# Patient Record
Sex: MALE | Race: ASIAN | NOT HISPANIC OR LATINO | Employment: FULL TIME | URBAN - METROPOLITAN AREA
[De-identification: names, ages, dates, MRNs, and addresses within clinical notes are randomized per-mention and may not be internally consistent; named-entity substitution may affect disease eponyms.]

---

## 2023-06-05 ENCOUNTER — HOSPITAL ENCOUNTER (EMERGENCY)
Facility: HOSPITAL | Age: 38
Discharge: HOME | End: 2023-06-05
Attending: EMERGENCY MEDICINE | Admitting: EMERGENCY MEDICINE
Payer: COMMERCIAL

## 2023-06-05 ENCOUNTER — APPOINTMENT (OUTPATIENT)
Dept: RADIOLOGY | Facility: HOSPITAL | Age: 38
End: 2023-06-05
Attending: EMERGENCY MEDICINE
Payer: COMMERCIAL

## 2023-06-05 VITALS
RESPIRATION RATE: 18 BRPM | DIASTOLIC BLOOD PRESSURE: 102 MMHG | WEIGHT: 231.48 LBS | HEART RATE: 83 BPM | TEMPERATURE: 98 F | OXYGEN SATURATION: 96 % | SYSTOLIC BLOOD PRESSURE: 150 MMHG

## 2023-06-05 DIAGNOSIS — R05.1 ACUTE COUGH: ICD-10-CM

## 2023-06-05 DIAGNOSIS — R06.02 SHORTNESS OF BREATH: ICD-10-CM

## 2023-06-05 LAB
ANION GAP SERPL CALCULATED.3IONS-SCNC: 10 MMOL/L (ref 7–15)
BASOPHILS # BLD AUTO: 0.1 10E3/UL (ref 0–0.2)
BASOPHILS NFR BLD AUTO: 1 %
BUN SERPL-MCNC: 14.2 MG/DL (ref 6–20)
CALCIUM SERPL-MCNC: 9.2 MG/DL (ref 8.6–10)
CHLORIDE SERPL-SCNC: 105 MMOL/L (ref 98–107)
CREAT SERPL-MCNC: 0.89 MG/DL (ref 0.67–1.17)
D DIMER PPP FEU-MCNC: <0.27 UG/ML FEU (ref 0–0.5)
DEPRECATED HCO3 PLAS-SCNC: 24 MMOL/L (ref 22–29)
EOSINOPHIL # BLD AUTO: 0.3 10E3/UL (ref 0–0.7)
EOSINOPHIL NFR BLD AUTO: 3 %
ERYTHROCYTE [DISTWIDTH] IN BLOOD BY AUTOMATED COUNT: 12.2 % (ref 10–15)
GFR SERPL CREATININE-BSD FRML MDRD: >90 ML/MIN/1.73M2
GLUCOSE SERPL-MCNC: 86 MG/DL (ref 70–99)
HCT VFR BLD AUTO: 53.8 % (ref 40–53)
HGB BLD-MCNC: 18.3 G/DL (ref 13.3–17.7)
IMM GRANULOCYTES # BLD: 0 10E3/UL
IMM GRANULOCYTES NFR BLD: 0 %
LYMPHOCYTES # BLD AUTO: 3 10E3/UL (ref 0.8–5.3)
LYMPHOCYTES NFR BLD AUTO: 30 %
MAGNESIUM SERPL-MCNC: 2.2 MG/DL (ref 1.7–2.3)
MCH RBC QN AUTO: 29.1 PG (ref 26.5–33)
MCHC RBC AUTO-ENTMCNC: 34 G/DL (ref 31.5–36.5)
MCV RBC AUTO: 86 FL (ref 78–100)
MONOCYTES # BLD AUTO: 0.7 10E3/UL (ref 0–1.3)
MONOCYTES NFR BLD AUTO: 7 %
NEUTROPHILS # BLD AUTO: 5.7 10E3/UL (ref 1.6–8.3)
NEUTROPHILS NFR BLD AUTO: 59 %
NRBC # BLD AUTO: 0 10E3/UL
NRBC BLD AUTO-RTO: 0 /100
NT-PROBNP SERPL-MCNC: <36 PG/ML (ref 0–450)
PLATELET # BLD AUTO: 277 10E3/UL (ref 150–450)
POTASSIUM SERPL-SCNC: 4 MMOL/L (ref 3.4–5.3)
RBC # BLD AUTO: 6.28 10E6/UL (ref 4.4–5.9)
SODIUM SERPL-SCNC: 139 MMOL/L (ref 136–145)
TROPONIN T SERPL HS-MCNC: 26 NG/L
TROPONIN T SERPL HS-MCNC: 26 NG/L
WBC # BLD AUTO: 9.8 10E3/UL (ref 4–11)

## 2023-06-05 PROCEDURE — 36415 COLL VENOUS BLD VENIPUNCTURE: CPT | Performed by: EMERGENCY MEDICINE

## 2023-06-05 PROCEDURE — 71046 X-RAY EXAM CHEST 2 VIEWS: CPT

## 2023-06-05 PROCEDURE — 96360 HYDRATION IV INFUSION INIT: CPT

## 2023-06-05 PROCEDURE — 258N000003 HC RX IP 258 OP 636: Performed by: EMERGENCY MEDICINE

## 2023-06-05 PROCEDURE — 94640 AIRWAY INHALATION TREATMENT: CPT

## 2023-06-05 PROCEDURE — 84484 ASSAY OF TROPONIN QUANT: CPT | Performed by: EMERGENCY MEDICINE

## 2023-06-05 PROCEDURE — 83880 ASSAY OF NATRIURETIC PEPTIDE: CPT | Performed by: EMERGENCY MEDICINE

## 2023-06-05 PROCEDURE — 85025 COMPLETE CBC W/AUTO DIFF WBC: CPT | Performed by: EMERGENCY MEDICINE

## 2023-06-05 PROCEDURE — 93005 ELECTROCARDIOGRAM TRACING: CPT | Performed by: EMERGENCY MEDICINE

## 2023-06-05 PROCEDURE — 250N000013 HC RX MED GY IP 250 OP 250 PS 637: Performed by: EMERGENCY MEDICINE

## 2023-06-05 PROCEDURE — 83735 ASSAY OF MAGNESIUM: CPT | Performed by: EMERGENCY MEDICINE

## 2023-06-05 PROCEDURE — 80048 BASIC METABOLIC PNL TOTAL CA: CPT | Performed by: EMERGENCY MEDICINE

## 2023-06-05 PROCEDURE — 99285 EMERGENCY DEPT VISIT HI MDM: CPT | Mod: 25

## 2023-06-05 PROCEDURE — 85379 FIBRIN DEGRADATION QUANT: CPT | Performed by: EMERGENCY MEDICINE

## 2023-06-05 RX ORDER — ALBUTEROL SULFATE 90 UG/1
2 AEROSOL, METERED RESPIRATORY (INHALATION) ONCE
Status: COMPLETED | OUTPATIENT
Start: 2023-06-05 | End: 2023-06-05

## 2023-06-05 RX ADMIN — ALBUTEROL SULFATE 2 PUFF: 90 AEROSOL, METERED RESPIRATORY (INHALATION) at 08:48

## 2023-06-05 RX ADMIN — SODIUM CHLORIDE 1000 ML: 9 INJECTION, SOLUTION INTRAVENOUS at 09:37

## 2023-06-05 ASSESSMENT — ACTIVITIES OF DAILY LIVING (ADL)
ADLS_ACUITY_SCORE: 35
ADLS_ACUITY_SCORE: 35

## 2023-06-05 NOTE — Clinical Note
Tomasa Cuello was seen and treated in our emergency department on 6/5/2023.         Sincerely,     [facility] Emergency Department

## 2023-06-05 NOTE — ED PROVIDER NOTES
EMERGENCY DEPARTMENT ENCOUNTER      NAME: Tomasa Cuello  AGE: 37 year old male  YOB: 1985  MRN: [id number]  EVALUATION DATE & TIME: 2023  8:15 AM    PCP: No primary care provider on file.    ED PROVIDER: Pauly Daniel M.D.      Chief Complaint   Patient presents with     Shortness of Breath     FINAL IMPRESSION:  1. Shortness of breath    2. Acute cough      ED COURSE & MEDICAL DECISION MAKIN:17 AM I met with the patient to gather history and to perform my initial exam. We discussed plans for the ED course, including diagnostic testing and treatment.     ED Course as of 23 1139   Mon 2023   0823 Patient is a pleasant 37-year-old male comes in today for evaluation of shortness of breath its been worse since Friday.  He has had some shortness of breath ever since he had COVID 2 years ago.  He will get episodes that will typically spontaneously resolved.  Has not been seen or evaluated.  He says since Friday he started having a cough and some green mucus production.  He is not having fevers.  Prior to Friday the cough was more of a dry cough.  He does notice that shortness of breath is worse all the time.  Its not necessarily worse with exertion or laying down.  He reports occasional wheezing although is not wheezing on my exam.  He says has been previously healthy other than the history of COVID.  He is not a smoker and takes no medications.  Sometimes he will have some relief with cough drops but then it will come back.  He is never been officially evaluated or treated for this in the past.  We will check some blood work including a D-dimer and BNP and troponin to see if there is a cardiac etiology to the shortness of breath.  We will get a chest x-ray to look for any signs of pneumonia or pneumothorax.  His lung sounds are clear bilaterally.  We will try a little albuterol inhaler and see if that helps his symptoms.  We will check for anemia and electrolyte abnormalities.   Patient is comfortable with the plan.   0907 D-dimer is negative so I do not think we need to move forward with CT angiogram of the chest.  Hemoglobin is 18 and I suspect the patient is little bit hemoconcentrated.  We do not have old labs to compare to.   0928 Initial troponin was 26.  His BNP is negative.  With her hemoglobin being at 18 I am can order some IV fluids on him and we will get a repeat troponin at 1040 and see how he feels.   0955 I did the patient on the findings and the plan for repeat troponin.  I think if that repeat troponin comes back okay then he can follow-up.  He needs a referral to primary care.   1129 Repeat troponin is stable.  I will get the patient discharged home and refer him for primary care follow-up.   1134 Patient did not get a lot of relief with the albuterol so I will not prescribe it for him.  He did not get much relief with the fluids either.  I will put an order for him to establish primary care as I think that would be the appropriate next step.  He does not currently have a primary care doctor.  We did talk about results and findings so far.  Patient was in agreement with the plan.       Pertinent Labs & Imaging studies reviewed. (See chart for details)    Medical Decision Making    History:    Supplemental history from: N/A  External Record(s) reviewed: I reviewed the patient's [facility] records and he did have a positive antibody test in 1/27/2021.  I do not see any other records.  Work Up:    Emergent/Severe conditions considered and evaluated for: Pneumothorax, pneumonia, ACS, myocarditis    I independently reviewed and interpreted EKG and chest x-ray which was negative for pneumothorax.  See full radiology report for all details    In additional to work up documented, I considered the following work up: Considered CT angiogram but patient had negative D-dimer so it was not needed.    Medications given that require intensive monitoring for toxicity:  "None    External consultation:    Discussion of management with another provider: None    Complicating factors:    Care impacted by chronic illness: N/A    Care affected by social determinants of health: No primary care doctor    Disposition considerations: Discharge  Prescriptions considered/prescribed: Considered albuterol but patient did not have a good response here in the emergency department    At the conclusion of the encounter I discussed  the results of all of the tests and the disposition with patient.   All questions were answered.  The patient acknowledged understanding and was involved in the decision making regarding the overall care plan.      I discussed with patient the utility, limitations and findings of the exam/interventions/studies done during this visit as well as the list of differential diagnosis and symptoms to monitor/return to ER for.  Additional verbal discharge instructions were provided.        MEDICATIONS GIVEN IN THE EMERGENCY:  Medications   albuterol (PROVENTIL HFA/VENTOLIN HFA) inhaler (2 puffs Inhalation $Given 6/5/23 1734)   0.9% sodium chloride BOLUS (0 mLs Intravenous Stopped 6/5/23 1048)       NEW PRESCRIPTIONS STARTED AT TODAY'S ER VISIT  New Prescriptions    No medications on file          =================================================================    HPI    Triage Note: Pt presents to triage ambulatory for shortness of breath. Pt reports SOB has been going on for \"years\" but in the past couple of months has gotten worse. SOB is worse with exertion. Pt reports difficulty taking deep breaths when it comes on and pain in his throat. No hx of asthma or COPD. Denies smoking or vaping. Np CP.       Patient information was obtained from: Patient    Use of : N/A      Tomaas Cuello is a 37 year old male with no pertinent medical history on file who presents to the ED for evaluation of shortness of breath.     Patient reports that after developing COVID-19 approximately " 2 years ago he has been having intermittent episodes of shortness of breath which have significantly worsened the past couple of months. He notes the last episode he developed was approximately 3 days ago, and he endorses continued shortness of breath since, including within the ED. He endorses associated cough which he states prior to 3 days ago was dry, but then 3 days ago it began to be a cough productive of green mucous. He endorses his cough provoking his shortness of breath, but he denies any other known provoking factors. He notes cough drops alleviate his symptoms, but he mentions that once the cough drop is finished his symptoms return. He also endorses occasional wheezing. He denies any health problems prior to COVID-19. He denies seeing any physician or ever being treated for his current symptoms. He endorses taking OTC Excedrin, but he denies taking any daily/prescription medications. He denies smoking. He denies any recent fever, abdominal pain, appetite change, or any other complications at this time.     PAST MEDICAL HISTORY:  History reviewed. No pertinent past medical history.    PAST SURGICAL HISTORY:  History reviewed. No pertinent surgical history.    CURRENT MEDICATIONS:    No current facility-administered medications for this encounter.  No current outpatient medications on file.    ALLERGIES:  No Known Allergies    FAMILY HISTORY:  No family history on file.    SOCIAL HISTORY:        PHYSICAL EXAM    VITAL SIGNS: BP (!) 154/91   Pulse 80   Temp 98  F (36.7  C) (Oral)   Resp 18   Wt 105 kg (231 lb 7.7 oz)   SpO2 96%    GENERAL: Awake, alert, answering questions appropriately.   SPEECH:  Easy to understand speech, Normal volume and seng  PULMONARY: No respiratory distress, Lungs clear to auscultation bilaterally  CARDIOVASCULAR: Regular rate and rhythm, Distal pulses present and normal.  ABDOMINAL: Soft, Nondistended, Nontender, No rebound or guarding, No palpable masses  EXTREMITIES: No  lower extremity edema.  PSYCH: Normal mood and affect     LAB:  All pertinent labs reviewed and interpreted.  Results for orders placed or performed during the hospital encounter of 06/05/23   Chest XR,  PA & LAT    Impression    IMPRESSION: Lungs are clear. No pleural effusion. Heart size and pulmonary vascularity within normal limits.   Basic metabolic panel   Result Value Ref Range    Sodium 139 136 - 145 mmol/L    Potassium 4.0 3.4 - 5.3 mmol/L    Chloride 105 98 - 107 mmol/L    Carbon Dioxide (CO2) 24 22 - 29 mmol/L    Anion Gap 10 7 - 15 mmol/L    Urea Nitrogen 14.2 6.0 - 20.0 mg/dL    Creatinine 0.89 0.67 - 1.17 mg/dL    Calcium 9.2 8.6 - 10.0 mg/dL    Glucose 86 70 - 99 mg/dL    GFR Estimate >90 >60 mL/min/1.73m2   Result Value Ref Range    Magnesium 2.2 1.7 - 2.3 mg/dL   Result Value Ref Range    Troponin T, High Sensitivity 26 (H) <=22 ng/L   Nt probnp inpatient (BNP)   Result Value Ref Range    N terminal Pro BNP Inpatient <36 0 - 450 pg/mL   D dimer quantitative   Result Value Ref Range    D-Dimer Quantitative <0.27 0.00 - 0.50 ug/mL FEU   CBC with platelets and differential   Result Value Ref Range    WBC Count 9.8 4.0 - 11.0 10e3/uL    RBC Count 6.28 (H) 4.40 - 5.90 10e6/uL    Hemoglobin 18.3 (H) 13.3 - 17.7 g/dL    Hematocrit 53.8 (H) 40.0 - 53.0 %    MCV 86 78 - 100 fL    MCH 29.1 26.5 - 33.0 pg    MCHC 34.0 31.5 - 36.5 g/dL    RDW 12.2 10.0 - 15.0 %    Platelet Count 277 150 - 450 10e3/uL    % Neutrophils 59 %    % Lymphocytes 30 %    % Monocytes 7 %    % Eosinophils 3 %    % Basophils 1 %    % Immature Granulocytes 0 %    NRBCs per 100 WBC 0 <1 /100    Absolute Neutrophils 5.7 1.6 - 8.3 10e3/uL    Absolute Lymphocytes 3.0 0.8 - 5.3 10e3/uL    Absolute Monocytes 0.7 0.0 - 1.3 10e3/uL    Absolute Eosinophils 0.3 0.0 - 0.7 10e3/uL    Absolute Basophils 0.1 0.0 - 0.2 10e3/uL    Absolute Immature Granulocytes 0.0 <=0.4 10e3/uL    Absolute NRBCs 0.0 10e3/uL   Troponin T, High Sensitivity (now)   Result  Value Ref Range    Troponin T, High Sensitivity 26 (H) <=22 ng/L       RADIOLOGY:  Chest XR,  PA & LAT   Final Result   IMPRESSION: Lungs are clear. No pleural effusion. Heart size and pulmonary vascularity within normal limits.           EKG:    Date and time: June 5, 2023 at 826  Rate: 84 bpm  Rhythm: Sinus rhythm  AR interval: 154 ms  QRS interval: 84 ms  QT/QTc: 368/434 ms  ST changes or T wave changes: No acute ST or T wave abnormalities  Change from prior ECG: No prior to compare to  I have independently reviewed and interpreted this EKG.     I, Dinesh Woods, am serving as a scribe to document services personally performed by Dr. Daniel based on my observation and the provider's statements to me. I, Pauly Daniel MD attest that Dinesh Woods is acting in a scribe capacity, has observed my performance of the services and has documented them in accordance with my direction.    Pauly Daniel M.D.  Emergency Medicine  [facility] EMERGENCY DEPARTMENT  [facility] [facility] [id number] [phone]  Dept: [phone]       Pauly Daniel MD  06/05/23 5921

## 2023-06-05 NOTE — DISCHARGE INSTRUCTIONS
You were seen in the Emergency Department today for evaluation of shortness of breath.  Your lab work showed no cause of your symptoms. Your imaging studies showed no significant cause of your symptoms.  Follow up with your primary care physician to ensure resolution of symptoms. Return if you have new or worsening symptoms.

## 2023-06-05 NOTE — ED TRIAGE NOTES
"Pt presents to triage ambulatory for shortness of breath. Pt reports SOB has been going on for \"years\" but in the past couple of months has gotten worse. SOB is worse with exertion. Pt reports difficulty taking deep breaths when it comes on and pain in his throat. No hx of asthma or COPD. Denies smoking or vaping. Np CP.   "

## 2023-06-06 LAB
ATRIAL RATE - MUSE: 84 BPM
DIASTOLIC BLOOD PRESSURE - MUSE: NORMAL MMHG
INTERPRETATION ECG - MUSE: NORMAL
P AXIS - MUSE: 16 DEGREES
PR INTERVAL - MUSE: 154 MS
QRS DURATION - MUSE: 84 MS
QT - MUSE: 368 MS
QTC - MUSE: 434 MS
R AXIS - MUSE: 18 DEGREES
SYSTOLIC BLOOD PRESSURE - MUSE: NORMAL MMHG
T AXIS - MUSE: 8 DEGREES
VENTRICULAR RATE- MUSE: 84 BPM

## 2023-09-16 ENCOUNTER — HEALTH MAINTENANCE LETTER (OUTPATIENT)
Age: 38
End: 2023-09-16

## 2024-11-09 ENCOUNTER — HEALTH MAINTENANCE LETTER (OUTPATIENT)
Age: 39
End: 2024-11-09

## 2025-05-05 ENCOUNTER — HOSPITAL ENCOUNTER (EMERGENCY)
Facility: HOSPITAL | Age: 40
Discharge: HOME | End: 2025-05-05
Attending: FAMILY MEDICINE | Admitting: FAMILY MEDICINE
Payer: COMMERCIAL

## 2025-05-05 ENCOUNTER — APPOINTMENT (OUTPATIENT)
Dept: CT IMAGING | Facility: HOSPITAL | Age: 40
End: 2025-05-05
Attending: FAMILY MEDICINE
Payer: COMMERCIAL

## 2025-05-05 VITALS
DIASTOLIC BLOOD PRESSURE: 89 MMHG | BODY MASS INDEX: 37.67 KG/M2 | TEMPERATURE: 97.9 F | OXYGEN SATURATION: 96 % | HEIGHT: 67 IN | RESPIRATION RATE: 18 BRPM | HEART RATE: 78 BPM | SYSTOLIC BLOOD PRESSURE: 128 MMHG | WEIGHT: 240 LBS

## 2025-05-05 DIAGNOSIS — R22.2 MASS OF ANTERIOR ABDOMINAL WALL: ICD-10-CM

## 2025-05-05 LAB
ANION GAP SERPL CALCULATED.3IONS-SCNC: 7 MMOL/L (ref 7–15)
BASOPHILS # BLD AUTO: 0.1 10E3/UL (ref 0–0.2)
BASOPHILS NFR BLD AUTO: 1 %
BUN SERPL-MCNC: 13.9 MG/DL (ref 6–20)
CALCIUM SERPL-MCNC: 9.2 MG/DL (ref 8.8–10.4)
CHLORIDE SERPL-SCNC: 107 MMOL/L (ref 98–107)
CREAT SERPL-MCNC: 0.89 MG/DL (ref 0.67–1.17)
EGFRCR SERPLBLD CKD-EPI 2021: >90 ML/MIN/1.73M2
EOSINOPHIL # BLD AUTO: 0.4 10E3/UL (ref 0–0.7)
EOSINOPHIL NFR BLD AUTO: 5 %
ERYTHROCYTE [DISTWIDTH] IN BLOOD BY AUTOMATED COUNT: 12.3 % (ref 10–15)
GLUCOSE SERPL-MCNC: 96 MG/DL (ref 70–99)
HCO3 SERPL-SCNC: 29 MMOL/L (ref 22–29)
HCT VFR BLD AUTO: 50.8 % (ref 40–53)
HGB BLD-MCNC: 17.4 G/DL (ref 13.3–17.7)
IMM GRANULOCYTES # BLD: 0 10E3/UL
IMM GRANULOCYTES NFR BLD: 0 %
LYMPHOCYTES # BLD AUTO: 2.3 10E3/UL (ref 0.8–5.3)
LYMPHOCYTES NFR BLD AUTO: 28 %
MCH RBC QN AUTO: 29 PG (ref 26.5–33)
MCHC RBC AUTO-ENTMCNC: 34.3 G/DL (ref 31.5–36.5)
MCV RBC AUTO: 85 FL (ref 78–100)
MONOCYTES # BLD AUTO: 0.6 10E3/UL (ref 0–1.3)
MONOCYTES NFR BLD AUTO: 7 %
NEUTROPHILS # BLD AUTO: 5 10E3/UL (ref 1.6–8.3)
NEUTROPHILS NFR BLD AUTO: 59 %
NRBC # BLD AUTO: 0 10E3/UL
NRBC BLD AUTO-RTO: 0 /100
PLATELET # BLD AUTO: 264 10E3/UL (ref 150–450)
POTASSIUM SERPL-SCNC: 4.1 MMOL/L (ref 3.4–5.3)
RADIOLOGIST FLAGS: NORMAL
RBC # BLD AUTO: 5.99 10E6/UL (ref 4.4–5.9)
SODIUM SERPL-SCNC: 143 MMOL/L (ref 135–145)
WBC # BLD AUTO: 8.5 10E3/UL (ref 4–11)

## 2025-05-05 PROCEDURE — 250N000011 HC RX IP 250 OP 636: Performed by: FAMILY MEDICINE

## 2025-05-05 PROCEDURE — 36415 COLL VENOUS BLD VENIPUNCTURE: CPT | Performed by: FAMILY MEDICINE

## 2025-05-05 PROCEDURE — 80048 BASIC METABOLIC PNL TOTAL CA: CPT | Performed by: FAMILY MEDICINE

## 2025-05-05 PROCEDURE — 85004 AUTOMATED DIFF WBC COUNT: CPT | Performed by: FAMILY MEDICINE

## 2025-05-05 PROCEDURE — 99285 EMERGENCY DEPT VISIT HI MDM: CPT | Mod: 25

## 2025-05-05 PROCEDURE — 74177 CT ABD & PELVIS W/CONTRAST: CPT

## 2025-05-05 RX ORDER — IOPAMIDOL 755 MG/ML
90 INJECTION, SOLUTION INTRAVASCULAR ONCE
Status: COMPLETED | OUTPATIENT
Start: 2025-05-05 | End: 2025-05-05

## 2025-05-05 RX ADMIN — IOPAMIDOL 90 ML: 755 INJECTION, SOLUTION INTRAVENOUS at 07:41

## 2025-05-05 ASSESSMENT — ACTIVITIES OF DAILY LIVING (ADL)
ADLS_ACUITY_SCORE: 41

## 2025-05-05 ASSESSMENT — COLUMBIA-SUICIDE SEVERITY RATING SCALE - C-SSRS
1. IN THE PAST MONTH, HAVE YOU WISHED YOU WERE DEAD OR WISHED YOU COULD GO TO SLEEP AND NOT WAKE UP?: NO
2. HAVE YOU ACTUALLY HAD ANY THOUGHTS OF KILLING YOURSELF IN THE PAST MONTH?: NO
6. HAVE YOU EVER DONE ANYTHING, STARTED TO DO ANYTHING, OR PREPARED TO DO ANYTHING TO END YOUR LIFE?: NO

## 2025-05-05 NOTE — DISCHARGE INSTRUCTIONS
Follow-up with your primary care provider in 3 to 6 months for repeat evaluation of this mass, either with ultrasound, MRI, or CT scan    Primary care clinic will call you to schedule follow-up appoint

## 2025-05-05 NOTE — ED PROVIDER NOTES
EMERGENCY DEPARTMENT ENCOUNTER      NAME: Tomasa Cuello  AGE: 39 year old male  YOB: 1985  MRN: [id number]  EVALUATION DATE & TIME: 5/5/2025  5:58 AM    PCP: No Ref-Primary, Physician    ED PROVIDER: Rock Giron M.D.    Chief Complaint   Patient presents with    Abdominal Pain       FINAL IMPRESSION:  No diagnosis found.    ED COURSE & MEDICAL DECISION MAKING:    Pertinent Labs & Imaging studies independently interpreted by me. (See chart for details)  Reviewed prior emergency department note from June 2023 which was for acute upper respiratory symptoms, patient with cardiac evaluation which was negative and discharged  ED Course as of 05/05/25 0809   Mon May 05, 2025   0617 Patient seen and examined, presents with concern of swelling of the left sided abdomen.  Noted this today, no known injury, mildly painful.  On exam patient is vitally stable, in the left lateral abdomen there is an ovoid mass that seems to be in the subcutaneous or muscular tissue.  This likely represents hematoma or lipoma although it feels a little more firm than a lipoma.  CT scan is ordered.  Assured patient that on exam this mass is adjacent to her external to the abdominal wall musculature and not intra-abdominal.  No abdominal tenderness otherwise on exam.   0651 Labs independently interpreted by me with normal CBC   0750 CT scan of the abdomen/pelvis independently interpreted by me demonstrates a intramuscular mass in the area of patient's symptoms.  This does not appear to be hematoma and could be a lipoma   0806 CT Abdomen Pelvis w Contrast  IMPRESSION:   1.  Intramuscular left abdominal wall fat density mass measuring 7.5 x 5.5 x 8.7 cm. This is favored to represent a lipoma. However, given the size and minimal internal complexity a follow-up in 3-6 months is recommended to ensure stability. Follow-up   could be performed with ultrasound and/or MRI.  2.  Hepatic steatosis.     At the conclusion of the encounter I  discussed the results of all of the tests and the disposition. The questions were answered. The patient or family acknowledged understanding and was agreeable with the care plan.     Medical Decision Making      Discharge. No recommendations on prescription strength medication(s). N/A.    MEDICATIONS GIVEN IN THE EMERGENCY:  Medications - No data to display    NEW PRESCRIPTIONS STARTED AT TODAY'S ER VISIT  New Prescriptions    No medications on file       =================================================================    HPI    Patient information was obtained from: Patient      Tomasa Cuello is a 39 year old male with no seen pertinent history who presents to this ED via walk in for evaluation of left sided abdominal swelling.    He was asymptomatic last night (05/04/2025). Since an onset earlier this morning (05/05/2025) he has endorsed abdominal swelling to the left lateral abdomen with mild pain. He endorses left sided abdominal pressure with exertion. He denies recent fall, injury, or any other symptoms at this time.    Per chart review, 06/05/2023 [facility] Emergency Department visit for evaluation of shortness of breath since COVID 2 years prior exacerbated with a recent cough. Labs done. D-dimer negative. Hemoglobin 18. Initial troponin was 26, repeat troponin was stable.  His BNP was negative. Discharged in stable condition with PCP establishment referral for follow up.      REVIEW OF SYSTEMS   Review of Systems   All other systems reviewed and negative    PAST MEDICAL HISTORY:  No past medical history on file.    PAST SURGICAL HISTORY:  No past surgical history on file.    CURRENT MEDICATIONS:    No current facility-administered medications for this encounter.     No current outpatient medications on file.       ALLERGIES:  No Known Allergies    FAMILY HISTORY:  No family history on file.    SOCIAL HISTORY:   Social History     Socioeconomic History    Marital status: Single       VITALS:  BP (!) 139/93    "Pulse 83   Temp 97.9  F (36.6  C) (Oral)   Resp 16   Ht 1.702 m (5' 7\")   Wt 108.9 kg (240 lb)   SpO2 95%   BMI 37.59 kg/m      PHYSICAL EXAM:  Physical Exam  Vitals and nursing note reviewed.   Constitutional:       Appearance: Normal appearance.   HENT:      Head: Normocephalic and atraumatic.      Right Ear: External ear normal.      Left Ear: External ear normal.      Nose: Nose normal.      Mouth/Throat:      Mouth: Mucous membranes are moist.   Eyes:      Extraocular Movements: Extraocular movements intact.      Conjunctiva/sclera: Conjunctivae normal.      Pupils: Pupils are equal, round, and reactive to light.   Cardiovascular:      Rate and Rhythm: Normal rate and regular rhythm.   Pulmonary:      Effort: Pulmonary effort is normal.      Breath sounds: Normal breath sounds. No wheezing or rales.   Abdominal:      General: Abdomen is flat.      Palpations: Abdomen is soft.      Tenderness: There is abdominal tenderness (5 cm to 10 cm firm non mobile tender mass in left lateral abdomen.). There is no guarding.   Musculoskeletal:         General: Normal range of motion.      Cervical back: Normal range of motion and neck supple.      Right lower leg: No edema.      Left lower leg: No edema.   Lymphadenopathy:      Cervical: No cervical adenopathy.   Skin:     General: Skin is warm and dry.   Neurological:      General: No focal deficit present.      Mental Status: He is alert and oriented to person, place, and time. Mental status is at baseline.      Comments: No gross focal neurologic deficits   Psychiatric:         Mood and Affect: Mood normal.         Behavior: Behavior normal.         Thought Content: Thought content normal.     I, Roshan Nova, am serving as a scribe to document services personally performed by Dr. Giron based on my observation and the provider's statements to me. I, Rock Giron MD attest that Roshan Nova is acting in a scribe capacity, has observed my performance " of the services and has documented them in accordance with my direction.    Rock Giron M.D.  Emergency Medicine  [facility] EMERGENCY DEPARTMENT  [address] [place] [id number] [phone]  Dept: [phone]      Rock Giron MD  05/05/25 0809

## 2025-05-05 NOTE — ED TRIAGE NOTES
Pt reports onset of a left sided abdominal pain last night. Swelling was noted on the left side of the pt's abdomen. Denies nausea, emesis or diarrhea. Denies any hx of abdominal surgeries.